# Patient Record
Sex: FEMALE | Race: WHITE | NOT HISPANIC OR LATINO | Employment: UNEMPLOYED | URBAN - METROPOLITAN AREA
[De-identification: names, ages, dates, MRNs, and addresses within clinical notes are randomized per-mention and may not be internally consistent; named-entity substitution may affect disease eponyms.]

---

## 2020-02-09 ENCOUNTER — HOSPITAL ENCOUNTER (EMERGENCY)
Facility: HOSPITAL | Age: 30
Discharge: HOME | End: 2020-02-09
Admitting: EMERGENCY MEDICINE

## 2020-02-09 ENCOUNTER — APPOINTMENT (OUTPATIENT)
Dept: GENERAL RADIOLOGY | Facility: HOSPITAL | Age: 30
End: 2020-02-09

## 2020-02-09 VITALS
HEIGHT: 67 IN | OXYGEN SATURATION: 100 % | WEIGHT: 195.33 LBS | DIASTOLIC BLOOD PRESSURE: 76 MMHG | BODY MASS INDEX: 30.66 KG/M2 | TEMPERATURE: 98.7 F | HEART RATE: 90 BPM | SYSTOLIC BLOOD PRESSURE: 115 MMHG | RESPIRATION RATE: 16 BRPM

## 2020-02-09 DIAGNOSIS — J10.1 INFLUENZA B: Primary | ICD-10-CM

## 2020-02-09 DIAGNOSIS — R05.9 COUGH IN ADULT: ICD-10-CM

## 2020-02-09 LAB
FLUAV SUBTYP SPEC NAA+PROBE: NOT DETECTED
FLUBV RNA ISLT QL NAA+PROBE: DETECTED
S PYO AG THROAT QL: NEGATIVE

## 2020-02-09 PROCEDURE — 71046 X-RAY EXAM CHEST 2 VIEWS: CPT

## 2020-02-09 PROCEDURE — 99283 EMERGENCY DEPT VISIT LOW MDM: CPT

## 2020-02-09 PROCEDURE — 87502 INFLUENZA DNA AMP PROBE: CPT | Performed by: NURSE PRACTITIONER

## 2020-02-09 PROCEDURE — 87651 STREP A DNA AMP PROBE: CPT | Performed by: NURSE PRACTITIONER

## 2020-02-09 NOTE — ED PROVIDER NOTES
Subjective   Patient is a 29-year-old white female comes in with complaints of cough for about a week but yesterday started with sore throat fever congestion daughter sick also has tried some stuff over-the-counter nothing is helping denies any nausea vomiting or diarrhea works in a  that with strep and flu going around.  Patient is not a smoker      History provided by:  Patient  Sore Throat   Location:  Generalized  Quality:  Aching  Severity:  Mild  Duration:  1 day  Timing:  Constant  Progression:  Worsening  Chronicity:  New  Relieved by:  None tried  Ineffective treatments:  None tried  Associated symptoms: cough and fever    Associated symptoms: no abdominal pain, no adenopathy, no chest pain, no chills, no drooling, no ear discharge, no ear pain, no epistaxis, no eye discharge, no headaches, no neck stiffness, no night sweats, no plugged ear sensation, no postnasal drip, no rash, no rhinorrhea, no shortness of breath, no sinus congestion, no stridor, no trouble swallowing and no voice change        Review of Systems   Constitutional: Positive for fever. Negative for activity change, appetite change, chills, fatigue and night sweats.   HENT: Positive for sore throat. Negative for congestion, drooling, ear discharge, ear pain, nosebleeds, postnasal drip, rhinorrhea, trouble swallowing and voice change.    Eyes: Negative for discharge and redness.   Respiratory: Positive for cough. Negative for apnea, chest tightness, shortness of breath and stridor.    Cardiovascular: Negative for chest pain, palpitations and leg swelling.   Gastrointestinal: Negative for abdominal distention, abdominal pain and nausea.   Genitourinary: Negative for urgency.   Musculoskeletal: Negative for back pain, joint swelling, neck pain and neck stiffness.   Skin: Negative for color change, pallor and rash.   Neurological: Negative for dizziness, numbness and headaches.   Hematological: Negative for adenopathy.       Past Medical  History:   Diagnosis Date   • Anemia        Allergies   Allergen Reactions   • Codeine Dermatitis   • Cortisone Dermatitis       Past Surgical History:   Procedure Laterality Date   •  SECTION      x 3       No family history on file.    Social History     Socioeconomic History   • Marital status: Single     Spouse name: Not on file   • Number of children: Not on file   • Years of education: Not on file   • Highest education level: Not on file   Tobacco Use   • Smoking status: Former Smoker   • Smokeless tobacco: Never Used   • Tobacco comment: quit 3 yrs ago   Substance and Sexual Activity   • Alcohol use: No     Frequency: Never   • Drug use: No           Objective   Physical Exam   Constitutional: She is oriented to person, place, and time. She appears well-developed and well-nourished. No distress.   HENT:   Head: Normocephalic and atraumatic.   Right Ear: Hearing, tympanic membrane and ear canal normal. No drainage, swelling or tenderness. No middle ear effusion.   Left Ear: Hearing, tympanic membrane and ear canal normal. No drainage, swelling or tenderness.  No middle ear effusion.   Mouth/Throat: Uvula is midline and oropharynx is clear and moist. No oral lesions. No uvula swelling. No oropharyngeal exudate, posterior oropharyngeal edema, posterior oropharyngeal erythema or tonsillar abscesses.   Eyes: Pupils are equal, round, and reactive to light. Conjunctivae and EOM are normal.   Neck: Normal range of motion. Neck supple.   Cardiovascular: Normal rate, regular rhythm and normal heart sounds. Exam reveals no gallop and no friction rub.   No murmur heard.  Pulmonary/Chest: Effort normal and breath sounds normal. No stridor. No respiratory distress. She has no wheezes. She has no rhonchi. She has no rales. She exhibits no tenderness.   Abdominal: Soft. Bowel sounds are normal. She exhibits no distension.   Musculoskeletal: Normal range of motion.   Neurological: She is alert and oriented to person,  place, and time.   Skin: Skin is warm and dry. She is not diaphoretic.   Psychiatric: She has a normal mood and affect. Her behavior is normal. Judgment and thought content normal.   Nursing note and vitals reviewed.      Procedures           ED Course  ED Course as of Feb 09 1412   Sun Feb 09, 2020   1346 Needed for strep    [QUENTIN]   1347 Positive for influenza B    [QUENTIN]      ED Course User Index  [QUENTIN] Nathalie Nieves, CECILIO          Xr Chest 2 View    Result Date: 2/9/2020  Negative chest radiograph.   Electronically Signed By-Elliot Stubbs On:2/9/2020 1:54 PM This report was finalized on [id number] by  Elliot Stubbs, .    Medications - No data to display  Labs Reviewed   INFLUENZA ANTIGEN, RAPID - Abnormal; Notable for the following components:       Result Value    Influenza B PCR Detected (*)     All other components within normal limits   RAPID STREP A SCREEN - Normal                                           MDM  Number of Diagnoses or Management Options  Cough in adult:   Influenza B:   Diagnosis management comments: Disposition: Discharged.    Patient discharged in stable condition.    Reviewed implications of results, diagnosis, meds, responsibility to follow up, warning signs and symptoms of possible worsening, potential complications and reasons to return to ER chest pain shortness of breath fever increase    Patient/Family voiced understanding of above instructions.    Discussed plan for discharge, as there is no emergent indication for admission.  Pt/family is agreeable and understands need for follow up and repeat testing.  Pt is aware that discharge does not mean that nothing is wrong but it indicates no emergency is present and they must continue care with follow-up as given below or physician of their choice.     FOLLOW-UP  No follow-up provider specified.       Medication List      No changes were made to your prescriptions during this visit.            Amount and/or Complexity of Data  Reviewed  Clinical lab tests: reviewed  Tests in the radiology section of CPT®: reviewed        Final diagnoses:   Influenza B   Cough in adult            Nathalie Nieves, APRN  02/09/20 1412

## 2020-02-09 NOTE — DISCHARGE INSTRUCTIONS
Over-the-counter Tylenol Motrin or Robitussin use as directed on the bottle follow-up if increased symptoms

## 2020-08-03 ENCOUNTER — APPOINTMENT (OUTPATIENT)
Dept: CT IMAGING | Facility: HOSPITAL | Age: 30
End: 2020-08-03

## 2020-08-03 ENCOUNTER — HOSPITAL ENCOUNTER (EMERGENCY)
Facility: HOSPITAL | Age: 30
Discharge: HOME | End: 2020-08-03
Attending: EMERGENCY MEDICINE | Admitting: EMERGENCY MEDICINE

## 2020-08-03 VITALS
SYSTOLIC BLOOD PRESSURE: 107 MMHG | RESPIRATION RATE: 16 BRPM | DIASTOLIC BLOOD PRESSURE: 64 MMHG | HEIGHT: 67 IN | HEART RATE: 83 BPM | BODY MASS INDEX: 31.73 KG/M2 | WEIGHT: 202.16 LBS | OXYGEN SATURATION: 95 % | TEMPERATURE: 98 F

## 2020-08-03 DIAGNOSIS — M54.2 NECK PAIN: ICD-10-CM

## 2020-08-03 DIAGNOSIS — R51.9 NONINTRACTABLE HEADACHE, UNSPECIFIED CHRONICITY PATTERN, UNSPECIFIED HEADACHE TYPE: Primary | ICD-10-CM

## 2020-08-03 PROCEDURE — 96372 THER/PROPH/DIAG INJ SC/IM: CPT

## 2020-08-03 PROCEDURE — 99283 EMERGENCY DEPT VISIT LOW MDM: CPT

## 2020-08-03 PROCEDURE — 72125 CT NECK SPINE W/O DYE: CPT

## 2020-08-03 PROCEDURE — 25010000002 KETOROLAC TROMETHAMINE PER 15 MG: Performed by: EMERGENCY MEDICINE

## 2020-08-03 PROCEDURE — 70450 CT HEAD/BRAIN W/O DYE: CPT

## 2020-08-03 RX ORDER — CYCLOBENZAPRINE HCL 10 MG
10 TABLET ORAL 3 TIMES DAILY PRN
Qty: 20 TABLET | Refills: 0 | Status: SHIPPED | OUTPATIENT
Start: 2020-08-03 | End: 2021-03-01

## 2020-08-03 RX ORDER — METHYLPREDNISOLONE 4 MG/1
TABLET ORAL
Qty: 21 TABLET | Refills: 0 | Status: SHIPPED | OUTPATIENT
Start: 2020-08-03 | End: 2021-03-01

## 2020-08-03 RX ORDER — APIS MELLIFERA, EUPHRASIA STRICTA AND SCHOENOCAULON OFFICINALE SEED 6; 6; 6 [HP_X]/.45ML; [HP_X]/.45ML; [HP_X]/.45ML
SOLUTION/ DROPS OPHTHALMIC
COMMUNITY
End: 2023-02-28

## 2020-08-03 RX ORDER — KETOROLAC TROMETHAMINE 30 MG/ML
30 INJECTION, SOLUTION INTRAMUSCULAR; INTRAVENOUS ONCE
Status: COMPLETED | OUTPATIENT
Start: 2020-08-03 | End: 2020-08-03

## 2020-08-03 RX ADMIN — KETOROLAC TROMETHAMINE 30 MG: 30 INJECTION, SOLUTION INTRAMUSCULAR at 14:11

## 2020-08-03 NOTE — DISCHARGE INSTRUCTIONS
Follow-up with your primary doctor.  Return to the emergency room for any new or worsening symptoms or if you have any other questions or concerns.  Take medications as prescribed.

## 2020-08-03 NOTE — ED NOTES
Pt c/o neck pain radiating into upper back and head after waking up yesterday morning. Pt denies any injury, denies n/v or dizziness or blurred vision. Pt states she does have an eye infection that she has been taking OTC medication for.     Mariana Vick, RN  08/03/20 7585

## 2020-08-03 NOTE — ED PROVIDER NOTES
"Subjective   Chief complaint: Neck pain    29-year-old female presents with neck pain.  Patient states symptoms started yesterday morning.  She states she has pain when trying to turn her head.  The pain is diffusely throughout the back of her neck and radiates into her head giving her a headache.  She denies any fever.  She denies any injury to the area.  She denies any numbness, weakness, tingling.  She denies any other complaints.  She states she feels fine otherwise.      History provided by:  Patient      Review of Systems   Constitutional: Negative for fatigue and fever.   HENT: Negative for congestion.    Eyes: Negative for redness.   Respiratory: Negative for cough and shortness of breath.    Cardiovascular: Negative for chest pain.   Gastrointestinal: Negative for abdominal pain.   Musculoskeletal: Positive for neck pain.   Skin: Negative for rash.   Neurological: Positive for headaches. Negative for dizziness, weakness and numbness.       Past Medical History:   Diagnosis Date   • Anemia        Allergies   Allergen Reactions   • Codeine Dermatitis   • Cortisone Dermatitis       Past Surgical History:   Procedure Laterality Date   •  SECTION      x 3       History reviewed. No pertinent family history.    Social History     Socioeconomic History   • Marital status: Single     Spouse name: Not on file   • Number of children: Not on file   • Years of education: Not on file   • Highest education level: Not on file   Tobacco Use   • Smoking status: Former Smoker   • Smokeless tobacco: Never Used   • Tobacco comment: quit 3 yrs ago   Substance and Sexual Activity   • Alcohol use: No     Frequency: Never   • Drug use: No       /66   Pulse 83   Temp 97.9 °F (36.6 °C) (Oral)   Resp 14   Ht 170.2 cm (67\")   Wt 91.7 kg (202 lb 2.6 oz)   LMP 2020   SpO2 94%   Breastfeeding No   BMI 31.66 kg/m²       Objective   Physical Exam   Constitutional: She is oriented to person, place, and time. She " appears well-developed and well-nourished.   HENT:   Head: Normocephalic and atraumatic.   Eyes: Pupils are equal, round, and reactive to light.   Neck:   Mild tenderness throughout the posterior neck over the C-spine as well as the paraspinal musculature bilaterally.  Decreased range of motion due to pain.   Cardiovascular: Normal rate, regular rhythm and normal heart sounds.   Pulmonary/Chest: Effort normal and breath sounds normal. No respiratory distress.   Musculoskeletal: Normal range of motion.   Neurological: She is alert and oriented to person, place, and time.   No focal motor or sensory deficit   Skin: Skin is warm and dry.   Nursing note and vitals reviewed.      Procedures           ED Course      Ct Head Without Contrast    Result Date: 8/3/2020  No acute intracranial abnormality identified. Negative noncontrasted head CT. Brain MRI is more sensitive to evaluate for acute or subacute infarcts and to evaluate for intracranial metastatic disease.  Electronically Signed ByIsaura Duran On:8/3/2020 1:34 PM This report was finalized on [id number] by  Francisco Duran, .    Ct Cervical Spine Without Contrast    Result Date: 8/3/2020   1. The cerebellar tonsils are somewhat low-lying, between 5 and 6 mm below the foramen magnum. 2. No evidence of acute C-spine fracture or subluxation.  Electronically Signed ByIsaura Duran On:8/3/2020 1:39 PM This report was finalized on [id number] by  Francisco Duran, .                                         MDM   Patient had the above evaluation.  Results were discussed with the patient.  CT head and C-spine showed no acute abnormality.  She was made aware of the low lying cerebellar tonsils and she states she has two family members with Chiari Malformation.  She will follow-up with her primary doctor for this.  She will be discharged with a prescription for Medrol Dosepak and Flexeril.      Final diagnoses:   Nonintractable headache, unspecified chronicity pattern,  unspecified headache type   Neck pain            Konstantin Shukla MD  08/03/20 [id number]

## 2021-03-01 PROBLEM — D64.9 ANEMIA: Status: ACTIVE | Noted: 2021-03-01

## 2021-11-30 ENCOUNTER — APPOINTMENT (OUTPATIENT)
Dept: GENERAL RADIOLOGY | Facility: HOSPITAL | Age: 31
End: 2021-11-30

## 2021-11-30 ENCOUNTER — HOSPITAL ENCOUNTER (EMERGENCY)
Facility: HOSPITAL | Age: 31
Discharge: HOME | End: 2021-11-30
Admitting: EMERGENCY MEDICINE

## 2021-11-30 VITALS
TEMPERATURE: 97.5 F | HEIGHT: 67 IN | OXYGEN SATURATION: 99 % | HEART RATE: 89 BPM | BODY MASS INDEX: 33.6 KG/M2 | SYSTOLIC BLOOD PRESSURE: 108 MMHG | WEIGHT: 214.07 LBS | RESPIRATION RATE: 18 BRPM | DIASTOLIC BLOOD PRESSURE: 65 MMHG

## 2021-11-30 DIAGNOSIS — R05.9 COUGH: ICD-10-CM

## 2021-11-30 DIAGNOSIS — J18.9 PNEUMONIA OF LEFT LUNG DUE TO INFECTIOUS ORGANISM, UNSPECIFIED PART OF LUNG: ICD-10-CM

## 2021-11-30 DIAGNOSIS — R50.9 FEVER, UNSPECIFIED FEVER CAUSE: Primary | ICD-10-CM

## 2021-11-30 LAB
B PARAPERT DNA SPEC QL NAA+PROBE: NOT DETECTED
B PERT DNA SPEC QL NAA+PROBE: NOT DETECTED
C PNEUM DNA NPH QL NAA+NON-PROBE: NOT DETECTED
FLUAV SUBTYP SPEC NAA+PROBE: NOT DETECTED
FLUBV RNA ISLT QL NAA+PROBE: NOT DETECTED
HADV DNA SPEC NAA+PROBE: NOT DETECTED
HCOV 229E RNA SPEC QL NAA+PROBE: NOT DETECTED
HCOV HKU1 RNA SPEC QL NAA+PROBE: NOT DETECTED
HCOV NL63 RNA SPEC QL NAA+PROBE: NOT DETECTED
HCOV OC43 RNA SPEC QL NAA+PROBE: NOT DETECTED
HMPV RNA NPH QL NAA+NON-PROBE: DETECTED
HPIV1 RNA ISLT QL NAA+PROBE: NOT DETECTED
HPIV2 RNA SPEC QL NAA+PROBE: NOT DETECTED
HPIV3 RNA NPH QL NAA+PROBE: NOT DETECTED
HPIV4 P GENE NPH QL NAA+PROBE: NOT DETECTED
M PNEUMO IGG SER IA-ACNC: NOT DETECTED
RHINOVIRUS RNA SPEC NAA+PROBE: NOT DETECTED
RSV RNA NPH QL NAA+NON-PROBE: NOT DETECTED
SARS-COV-2 RNA NPH QL NAA+NON-PROBE: NOT DETECTED

## 2021-11-30 PROCEDURE — 0202U NFCT DS 22 TRGT SARS-COV-2: CPT

## 2021-11-30 PROCEDURE — C9803 HOPD COVID-19 SPEC COLLECT: HCPCS

## 2021-11-30 PROCEDURE — 71045 X-RAY EXAM CHEST 1 VIEW: CPT

## 2021-11-30 PROCEDURE — 93005 ELECTROCARDIOGRAM TRACING: CPT

## 2021-11-30 PROCEDURE — 99283 EMERGENCY DEPT VISIT LOW MDM: CPT

## 2021-11-30 PROCEDURE — 94640 AIRWAY INHALATION TREATMENT: CPT

## 2021-11-30 PROCEDURE — 94799 UNLISTED PULMONARY SVC/PX: CPT

## 2021-11-30 RX ORDER — AMOXICILLIN AND CLAVULANATE POTASSIUM 875; 125 MG/1; MG/1
1 TABLET, FILM COATED ORAL 2 TIMES DAILY
Qty: 10 TABLET | Refills: 0 | Status: SHIPPED | OUTPATIENT
Start: 2021-11-30 | End: 2023-02-28

## 2021-11-30 RX ORDER — BROMPHENIRAMINE MALEATE, PSEUDOEPHEDRINE HYDROCHLORIDE, AND DEXTROMETHORPHAN HYDROBROMIDE 2; 30; 10 MG/5ML; MG/5ML; MG/5ML
5 SYRUP ORAL 4 TIMES DAILY PRN
Qty: 118 ML | Refills: 0 | Status: SHIPPED | OUTPATIENT
Start: 2021-11-30 | End: 2023-02-28

## 2021-11-30 RX ORDER — ALBUTEROL SULFATE 90 UG/1
2 AEROSOL, METERED RESPIRATORY (INHALATION) ONCE
Status: COMPLETED | OUTPATIENT
Start: 2021-11-30 | End: 2021-11-30

## 2021-11-30 RX ADMIN — ALBUTEROL SULFATE 2 PUFF: 108 INHALANT RESPIRATORY (INHALATION) at 19:51

## 2021-12-01 LAB — QT INTERVAL: 316 MS

## 2021-12-01 NOTE — ED PROVIDER NOTES
Subjective   Chief Complaint: Cough congestion    HPI: Patient is a 31-year-old female who presents to the ER via private vehicle for cough, fever, congestion beginning approximately 5 days ago.  States that her kids all had upper respiratory infections but all have improved and she has now developed symptoms.  She reports bilateral rib pain due to excessive coughing, states the cough is a dry cough.  T-max at home was 102.5, she has been treating with Tylenol.  She also states she has been treating her symptoms with DayQuil and NyQuil with very little relief.  She reports some chest pain with cough and deep breathing.  She has had no shortness of breath.  She denies recent illness or exposure, she is not vaccinated for COVID-19.  She denies sore throat, body aches, or headaches.    PCP:          Review of Systems   Constitutional: Positive for fever.   HENT: Negative.    Eyes: Negative.    Respiratory: Positive for cough and wheezing. Negative for shortness of breath.    Cardiovascular: Negative for chest pain and palpitations.   Gastrointestinal: Negative.    Genitourinary: Negative.    Musculoskeletal: Negative.    Skin: Negative.    Neurological: Negative for dizziness, weakness and headaches.   Hematological: Negative.    Psychiatric/Behavioral: Negative.        Past Medical History:   Diagnosis Date   • Anemia        Allergies   Allergen Reactions   • Codeine Dermatitis   • Cortisone Dermatitis       Past Surgical History:   Procedure Laterality Date   •  SECTION      x 3       No family history on file.    Social History     Socioeconomic History   • Marital status: Single   Tobacco Use   • Smoking status: Former Smoker   • Smokeless tobacco: Never Used   • Tobacco comment: quit 3 yrs ago   Substance and Sexual Activity   • Alcohol use: No   • Drug use: No           Objective   Physical Exam  Vitals reviewed.   Constitutional:       Appearance: She is obese.   HENT:      Head: Normocephalic.       "Nose: Congestion present.      Mouth/Throat:      Mouth: Mucous membranes are moist.      Pharynx: Oropharynx is clear. No oropharyngeal exudate.   Eyes:      Extraocular Movements: Extraocular movements intact.      Pupils: Pupils are equal, round, and reactive to light.   Cardiovascular:      Rate and Rhythm: Normal rate.      Pulses: Normal pulses.      Heart sounds: Normal heart sounds. No murmur heard.      Pulmonary:      Effort: Pulmonary effort is normal. No tachypnea, bradypnea or accessory muscle usage.      Breath sounds: No stridor. Wheezing present.      Comments: Anterior and posterior expiratory wheezes   Abdominal:      General: Bowel sounds are normal.      Palpations: Abdomen is soft.      Tenderness: There is no abdominal tenderness.   Musculoskeletal:         General: No swelling. Normal range of motion.      Cervical back: Normal range of motion. No rigidity.   Skin:     General: Skin is warm and dry.      Capillary Refill: Capillary refill takes less than 2 seconds.   Neurological:      General: No focal deficit present.      Mental Status: She is alert. Mental status is at baseline.      Motor: No weakness.   Psychiatric:         Mood and Affect: Mood normal.         Behavior: Behavior normal.         Thought Content: Thought content normal.         Judgment: Judgment normal.         Procedures      EKG obtained per triage protocol.  Reviewed by myself read by Dr. Rivera.  Rate of 100 sinus tachycardia without ST elevation or ectopy.         ED Course  ED Course as of 12/01/21 1441 Tue Nov 30, 2021 1951 Pending COVID testing []      ED Course User Index  [] Brissa Marino APRN      /65 (BP Location: Left arm, Patient Position: Lying)   Pulse 89   Temp 97.5 °F (36.4 °C) (Oral)   Resp 18   Ht 170.2 cm (67\")   Wt 97.1 kg (214 lb 1.1 oz)   LMP 11/01/2021   SpO2 99%   BMI 33.53 kg/m²   Labs Reviewed   RESPIRATORY PANEL PCR W/ COVID-19 (SARS-COV-2) " [facility]/GT/PANCH[facility]O/[facility] IN-HOUSE, NP SWAB IN UT/VTP, 3-4 HR TAT - Abnormal; Notable for the following components:       Result Value    Human Metapneumovirus Detected (*)     All other components within normal limits    Narrative:     In the setting of a positive respiratory panel with a viral infection PLUS a negative procalcitonin without other underlying concern for bacterial infection, consider observing off antibiotics or discontinuation of antibiotics and continue supportive care. If the respiratory panel is positive for atypical bacterial infection (Bordetella pertussis, Chlamydophila pneumoniae, or Mycoplasma pneumoniae), consider antibiotic de-escalation to target atypical bacterial infection.   COVID PRE-OP / PRE-PROCEDURE SCREENING ORDER (NO ISOLATION)    Narrative:     The following orders were created for panel order COVID PRE-OP / PRE-PROCEDURE SCREENING ORDER (NO ISOLATION) - Swab, Nasopharynx.  Procedure                               Abnormality         Status                     ---------                               -----------         ------                     Respiratory Panel PCR w/...[[id number]]  Abnormal            Final result                 Please view results for these tests on the individual orders.     Medications   albuterol sulfate HFA (PROVENTIL HFA;VENTOLIN HFA;PROAIR HFA) inhaler 2 puff (2 puffs Inhalation Given 11/30/21 1951)     XR Chest 1 View    Result Date: 11/30/2021  Consolidation overlying the left upper lung most concerning for left upper lobe pneumonia. Recommend radiographic follow-up to ensure resolution.  Electronically Signed By-Piero Jade MD On:11/30/2021 8:05 PM This report was finalized on [id number] by  Piero Jade MD.                                               MDM  Number of Diagnoses or Management Options  Cough  Fever, unspecified fever cause  Pneumonia of left lung due to infectious organism, unspecified part of lung  Diagnosis management  comments: While in the emergency room she was given albuterol inhaler treatment and chest x-ray was obtained.  Results noted to the left lobe pneumonia.  Patient is currently pending Covid testing results.  She will be given a prescription for Bromfed for her cough as well as Augmentin twice daily for 5 days and instructed to follow-up with primary care for repeat chest x-ray to ensure pneumonia has cleared.  Patient has had normal vital signs and no signs of hypoxia while she was in the ER.  Results were discussed with patient and she was advised to quarantine until Covid results have been obtained.  She will return to the ER for new or worsening symptoms.   She was advised that we will not call her on results and understand responsibility to follow-up on COVID-19 testing.  Chart review: No recent pertinent visits    Comorbidities: Denies    Differentials: Viral illness, COVID-19, pneumonia not all inclusive of differentials considered    Radiology interpretation:  X-rays reviewed by me and interpreted by radiologist,  Chest x-ray  Lab interpretation:  Labs viewed by me significant for, COVID-19 pending    Appropriate PPE worn during exam.           Amount and/or Complexity of Data Reviewed  Tests in the medicine section of CPT®: reviewed    Patient Progress  Patient progress: improved      Final diagnoses:   Fever, unspecified fever cause   Cough   Pneumonia of left lung due to infectious organism, unspecified part of lung       ED Disposition  ED Disposition     ED Disposition Condition Comment    Discharge Stable           PATIENT [facility] [address]  [phone]             Medication List      New Prescriptions    amoxicillin-clavulanate 875-125 MG per tablet  Commonly known as: AUGMENTIN  Take 1 tablet by mouth 2 (Two) Times a Day.     brompheniramine-pseudoephedrine-DM 30-2-10 MG/5ML syrup  Take 5 mL by mouth 4 (Four) Times a Day As Needed for Cough.           Where to Get Your  Medications      These medications were sent to [facility] - [place], [place] - [address] - [phone]  - [phone] FX  [address] [place] [address]    Phone: [phone]   · amoxicillin-clavulanate 875-125 MG per tablet  · brompheniramine-pseudoephedrine-DM 30-2-10 MG/5ML syrup          Brisas Marino, APRN  12/01/21 8692

## 2021-12-01 NOTE — DISCHARGE INSTRUCTIONS
Take antibiotic to completion.  Take Bromfed as needed for cough congestion.  Take Tylenol or ibuprofen as needed for headache or fever.  Do not mix ibuprofen with Advil, Aleve, Mobic, diclofenac or naproxen    Follow-up with primary care, get a repeat chest x-ray after he finished antibiotics to ensure resolution    Follow-up with your primary care provider in 1 week for recheck    Return to the ER for new or worsening symptoms